# Patient Record
Sex: FEMALE | Race: WHITE | Employment: FULL TIME | URBAN - METROPOLITAN AREA
[De-identification: names, ages, dates, MRNs, and addresses within clinical notes are randomized per-mention and may not be internally consistent; named-entity substitution may affect disease eponyms.]

---

## 2017-09-11 ENCOUNTER — HOSPITAL ENCOUNTER (EMERGENCY)
Age: 32
Discharge: HOME | End: 2017-09-11
Attending: EMERGENCY MEDICINE
Payer: COMMERCIAL

## 2017-09-11 ENCOUNTER — APPOINTMENT (OUTPATIENT)
Dept: GENERAL RADIOLOGY | Age: 32
End: 2017-09-11
Attending: EMERGENCY MEDICINE
Payer: COMMERCIAL

## 2017-09-11 VITALS
WEIGHT: 285 LBS | TEMPERATURE: 97.8 F | HEART RATE: 75 BPM | HEIGHT: 67 IN | OXYGEN SATURATION: 98 % | BODY MASS INDEX: 44.73 KG/M2 | RESPIRATION RATE: 16 BRPM | DIASTOLIC BLOOD PRESSURE: 85 MMHG | SYSTOLIC BLOOD PRESSURE: 128 MMHG

## 2017-09-11 DIAGNOSIS — S93.602A SPRAIN OF LEFT FOOT, INITIAL ENCOUNTER: Primary | ICD-10-CM

## 2017-09-11 DIAGNOSIS — S93.402A SPRAIN OF LEFT ANKLE, UNSPECIFIED LIGAMENT, INITIAL ENCOUNTER: ICD-10-CM

## 2017-09-11 PROCEDURE — L4350 ANKLE CONTROL ORTHO PRE OTS: HCPCS

## 2017-09-11 PROCEDURE — 99283 EMERGENCY DEPT VISIT LOW MDM: CPT

## 2017-09-11 PROCEDURE — 73630 X-RAY EXAM OF FOOT: CPT

## 2017-09-11 PROCEDURE — 73610 X-RAY EXAM OF ANKLE: CPT

## 2017-09-11 NOTE — ED TRIAGE NOTES
Triage note: Pt arrives with c/o left foot pain and mild discoloration that began yesterday after slipping on wood floor. Pt unable to bear weight. 2+ pedal pulses bilaterally.

## 2017-09-11 NOTE — ED PROVIDER NOTES
HPI Comments: 33 y.o. female with no significant past medical history who presents ambulatory from home accompanied by her parents with chief complaint of left foot pain. Pt states she slipped and fell last night on a wood floor. Pt states foot \"got stuck behind me\" when she fell. Pt states she used ice last night expecting pain and swelling to decrease overnight, but when she woke up, she was unable to bear weight. Pt denies any audible \"pop\" during the fall. Pt denies other injury. There are no other acute medical concerns at this time. Social hx: denies tobacco use; denies EtOH use; denies illicit drug use  PCP: Chasity Ruiz MD    Note written by Jesus Foster, as dictated by Maureen Alexander MD 6:33 AM        The history is provided by the patient. No  was used. History reviewed. No pertinent past medical history. History reviewed. No pertinent surgical history. No family history on file. Social History     Social History    Marital status: SINGLE     Spouse name: N/A    Number of children: N/A    Years of education: N/A     Occupational History    Not on file. Social History Main Topics    Smoking status: Never Smoker    Smokeless tobacco: Not on file    Alcohol use No    Drug use: No    Sexual activity: Not on file     Other Topics Concern    Not on file     Social History Narrative         ALLERGIES: Review of patient's allergies indicates no known allergies. Review of Systems   Constitutional: Negative for activity change, appetite change and fatigue. HENT: Negative for ear pain, facial swelling, sore throat and trouble swallowing. Eyes: Negative for pain, discharge and visual disturbance. Respiratory: Negative for chest tightness, shortness of breath and wheezing. Cardiovascular: Negative for chest pain and palpitations. Gastrointestinal: Negative for abdominal pain, blood in stool, nausea and vomiting.    Genitourinary: Negative for difficulty urinating, flank pain and hematuria. Musculoskeletal: Negative for arthralgias, joint swelling, myalgias and neck pain. Left foot pain   Skin: Negative for color change and rash. Neurological: Negative for dizziness, weakness, numbness and headaches. Hematological: Negative for adenopathy. Does not bruise/bleed easily. Psychiatric/Behavioral: Negative for behavioral problems, confusion and sleep disturbance. All other systems reviewed and are negative. Vitals:    09/11/17 0629   BP: 128/85   Pulse: 75   Resp: 16   Temp: 97.8 °F (36.6 °C)   SpO2: 98%   Weight: 129.3 kg (285 lb)   Height: 5' 7\" (1.702 m)            Physical Exam   Constitutional: She is oriented to person, place, and time. She appears well-developed and well-nourished. No distress. HENT:   Head: Normocephalic and atraumatic. Eyes: Conjunctivae and EOM are normal. Pupils are equal, round, and reactive to light. No scleral icterus. Neck: Normal range of motion. Neck supple. No JVD present. No tracheal deviation present. No thyromegaly present. Cardiovascular: Normal rate, regular rhythm and intact distal pulses. Pulmonary/Chest: Effort normal. No respiratory distress. Musculoskeletal: Normal range of motion. She exhibits no edema. Left foot: There is tenderness and swelling. Tenderness over inferior portion of medial malleolus. No tenderness over lateral aspect of malleolus. Swelling to dorsal lateral aspect at the base of the 5th metatarsal.    Neurological: She is alert and oriented to person, place, and time. She has normal reflexes. No cranial nerve deficit. Coordination normal.   Skin: Skin is warm and dry. No rash noted. No erythema. Psychiatric: She has a normal mood and affect. Her behavior is normal. Judgment and thought content normal.   Nursing note and vitals reviewed.   Note written by Jesus Royal, as dictated by Eb Gipson MD 6:33 AM     MDM  Number of Diagnoses or Management Options  Sprain of left ankle, unspecified ligament, initial encounter: new and requires workup  Sprain of left foot, initial encounter: new and requires workup     Amount and/or Complexity of Data Reviewed  Tests in the radiology section of CPT®: ordered and reviewed  Decide to obtain previous medical records or to obtain history from someone other than the patient: yes  Review and summarize past medical records: yes  Independent visualization of images, tracings, or specimens: yes    Risk of Complications, Morbidity, and/or Mortality  Presenting problems: moderate  Diagnostic procedures: moderate  Management options: moderate    Patient Progress  Patient progress: stable    ED Course       Procedures    7:08 AM  X-rays negative. Discussed available lab and imaging results with patient. She verbalizes understanding and agrees with care plan. Will place splint. Instructed patient to rest, ice, elevate and use ibuprofen as needed. Will discharge patient home with return precautions and ortho f/u.

## 2017-09-11 NOTE — Clinical Note
Splint, ice compresses four times a day for 20 minutes at a time. Keep the ankle and foot elevated as much as possible for the next few days. Minimize weight bearing with crutches and follow up with own MD if continued difficulty in the next 4-5 days.

## 2017-09-11 NOTE — DISCHARGE INSTRUCTIONS
Ankle Sprain: Care Instructions  Your Care Instructions    An ankle sprain can happen when you twist your ankle. The ligaments that support the ankle can get stretched and torn. Often the ankle is swollen and painful. Ankle sprains may take from several weeks to several months to heal. Usually, the more pain and swelling you have, the more severe your ankle sprain is and the longer it will take to heal. You can heal faster and regain strength in your ankle with good home treatment. It is very important to give your ankle time to heal completely, so that you do not easily hurt your ankle again. Follow-up care is a key part of your treatment and safety. Be sure to make and go to all appointments, and call your doctor if you are having problems. It's also a good idea to know your test results and keep a list of the medicines you take. How can you care for yourself at home? · Prop up your foot on pillows as much as possible for the next 3 days. Try to keep your ankle above the level of your heart. This will help reduce the swelling. · Follow your doctor's directions for wearing a splint or elastic bandage. Wrapping the ankle may help reduce or prevent swelling. · Your doctor may give you a splint, a brace, an air stirrup, or another form of ankle support to protect your ankle until it is healed. Wear it as directed while your ankle is healing. Do not remove it unless your doctor tells you to. After your ankle has healed, ask your doctor whether you should wear the brace when you exercise. · Put ice or cold packs on your injured ankle for 10 to 20 minutes at a time. Try to do this every 1 to 2 hours for the next 3 days (when you are awake) or until the swelling goes down. Put a thin cloth between the ice and your skin. · You may need to use crutches until you can walk without pain. If you do use crutches, try to bear some weight on your injured ankle if you can do so without pain.  This helps the ankle heal.  · Take pain medicines exactly as directed. ¨ If the doctor gave you a prescription medicine for pain, take it as prescribed. ¨ If you are not taking a prescription pain medicine, ask your doctor if you can take an over-the-counter medicine. · If you have been given ankle exercises to do at home, do them exactly as instructed. These can promote healing and help prevent lasting weakness. When should you call for help? Call your doctor now or seek immediate medical care if:  · Your pain is getting worse. · Your swelling is getting worse. · Your splint feels too tight or you are unable to loosen it. Watch closely for changes in your health, and be sure to contact your doctor if:  · You are not getting better after 1 week. Where can you learn more? Go to http://erica-zenon.info/. Enter U353 in the search box to learn more about \"Ankle Sprain: Care Instructions. \"  Current as of: March 21, 2017  Content Version: 11.3  © 0540-0905 [employer]. Care instructions adapted under license by [employer] (which disclaims liability or warranty for this information). If you have questions about a medical condition or this instruction, always ask your healthcare professional. [place] [address] any warranty or liability for your use of this information. Foot Sprain: Care Instructions  Your Care Instructions    A foot sprain occurs when you stretch or tear the ligaments around your foot. Ligaments are the tough tissues that connect one bone to another. A sprain can happen when you run, fall, or hit your toe against something. Sprains often happen when you jump or change direction quickly. This may occur when you play basketball, soccer, or other sports. Most foot sprains will get better with treatment at home. Follow-up care is a key part of your treatment and safety.  Be sure to make and go to all appointments, and call your doctor if you are having problems. It's also a good idea to know your test results and keep a list of the medicines you take. How can you care for yourself at home? · Walk or put weight on your sprained foot as long as it does not hurt. · If your doctor gave you a splint or immobilizer, wear it as directed. If you were given crutches, use them as directed. · For the first 2 days after your injury, avoid hot showers, hot tubs, or hot packs. They may increase swelling. · Put ice or a cold pack on your foot for 10 to 20 minutes at a time to stop swelling. Try this every 1 to 2 hours for 3 days (when you are awake) or until the swelling goes down. Put a thin cloth between the ice pack and your skin. Keep your splint dry. · After 2 or 3 days, if your swelling is gone, put a heating pad (set on low) or a warm cloth on your foot. Some doctors suggest that you go back and forth between hot and cold treatments. · Prop up your foot on a pillow when you ice it or anytime you sit or lie down. Try to keep it above the level of your heart. This will help reduce swelling. · Take pain medicines exactly as directed. ¨ If the doctor gave you a prescription medicine for pain, take it as prescribed. ¨ If you are not taking a prescription pain medicine, ask your doctor if you can take an over-the-counter medicine. · Do any exercises that your doctor or physical therapist suggests. · Return to your usual exercise gradually as you feel better. When should you call for help? Call your doctor now or seek immediate medical care if:  · You have increased or severe pain. · Your toes are cool or pale or change color. · Your wrap or splint feels too tight. · You have signs of a blood clot, such as:  ¨ Pain in your calf, back of the knee, thigh, or groin. ¨ Redness and swelling in your leg or groin. · You have tingling, weakness, or numbness in your leg or foot.   Watch closely for changes in your health, and be sure to contact your doctor if:  · You cannot put any weight on your foot. · You get a fever. · You do not get better as expected. Where can you learn more? Go to http://erica-zenon.info/. Enter L958 in the search box to learn more about \"Foot Sprain: Care Instructions. \"  Current as of: March 21, 2017  Content Version: 11.3  © 7015-1461 [employer]. Care instructions adapted under license by [employer] (which disclaims liability or warranty for this information). If you have questions about a medical condition or this instruction, always ask your healthcare professional. [place] [address] any warranty or liability for your use of this information.

## 2019-09-13 ENCOUNTER — HOSPITAL ENCOUNTER (EMERGENCY)
Age: 34
Discharge: HOME | End: 2019-09-13
Attending: EMERGENCY MEDICINE
Payer: COMMERCIAL

## 2019-09-13 ENCOUNTER — APPOINTMENT (OUTPATIENT)
Dept: GENERAL RADIOLOGY | Age: 34
End: 2019-09-13
Attending: EMERGENCY MEDICINE
Payer: COMMERCIAL

## 2019-09-13 VITALS
OXYGEN SATURATION: 100 % | HEIGHT: 67 IN | RESPIRATION RATE: 20 BRPM | TEMPERATURE: 98.4 F | WEIGHT: 285 LBS | BODY MASS INDEX: 44.73 KG/M2 | DIASTOLIC BLOOD PRESSURE: 96 MMHG | SYSTOLIC BLOOD PRESSURE: 153 MMHG | HEART RATE: 95 BPM

## 2019-09-13 DIAGNOSIS — R07.9 CHEST PAIN, UNSPECIFIED TYPE: Primary | ICD-10-CM

## 2019-09-13 LAB
ALBUMIN SERPL-MCNC: 3.3 G/DL (ref 3.5–5)
ALBUMIN/GLOB SERPL: 0.7 {RATIO} (ref 1.1–2.2)
ALP SERPL-CCNC: 123 U/L (ref 45–117)
ALT SERPL-CCNC: 24 U/L (ref 12–78)
ANION GAP SERPL CALC-SCNC: 8 MMOL/L (ref 5–15)
APPEARANCE UR: ABNORMAL
AST SERPL-CCNC: <3 U/L (ref 15–37)
BACTERIA URNS QL MICRO: NEGATIVE /HPF
BILIRUB SERPL-MCNC: 0.2 MG/DL (ref 0.2–1)
BILIRUB UR QL: NEGATIVE
BUN SERPL-MCNC: 16 MG/DL (ref 6–20)
BUN/CREAT SERPL: 17 (ref 12–20)
CALCIUM SERPL-MCNC: 9.1 MG/DL (ref 8.5–10.1)
CHLORIDE SERPL-SCNC: 104 MMOL/L (ref 97–108)
CO2 SERPL-SCNC: 25 MMOL/L (ref 21–32)
COLOR UR: ABNORMAL
COMMENT, HOLDF: NORMAL
CREAT SERPL-MCNC: 0.95 MG/DL (ref 0.55–1.02)
D DIMER PPP FEU-MCNC: 0.48 MG/L FEU (ref 0–0.65)
EPITH CASTS URNS QL MICRO: ABNORMAL /LPF
ERYTHROCYTE [DISTWIDTH] IN BLOOD BY AUTOMATED COUNT: 13.7 % (ref 11.5–14.5)
GLOBULIN SER CALC-MCNC: 4.9 G/DL (ref 2–4)
GLUCOSE SERPL-MCNC: 106 MG/DL (ref 65–100)
GLUCOSE UR STRIP.AUTO-MCNC: NEGATIVE MG/DL
HCT VFR BLD AUTO: 44.4 % (ref 35–47)
HGB BLD-MCNC: 14.2 G/DL (ref 11.5–16)
HGB UR QL STRIP: NEGATIVE
KETONES UR QL STRIP.AUTO: NEGATIVE MG/DL
LEUKOCYTE ESTERASE UR QL STRIP.AUTO: NEGATIVE
MCH RBC QN AUTO: 26.2 PG (ref 26–34)
MCHC RBC AUTO-ENTMCNC: 32 G/DL (ref 30–36.5)
MCV RBC AUTO: 81.9 FL (ref 80–99)
NITRITE UR QL STRIP.AUTO: NEGATIVE
NRBC # BLD: 0 K/UL (ref 0–0.01)
NRBC BLD-RTO: 0 PER 100 WBC
PH UR STRIP: 5.5 [PH] (ref 5–8)
PLATELET # BLD AUTO: 433 K/UL (ref 150–400)
PMV BLD AUTO: 9.9 FL (ref 8.9–12.9)
POTASSIUM SERPL-SCNC: 3.8 MMOL/L (ref 3.5–5.1)
PROT SERPL-MCNC: 8.2 G/DL (ref 6.4–8.2)
PROT UR STRIP-MCNC: NEGATIVE MG/DL
RBC # BLD AUTO: 5.42 M/UL (ref 3.8–5.2)
RBC #/AREA URNS HPF: ABNORMAL /HPF (ref 0–5)
SAMPLES BEING HELD,HOLD: NORMAL
SODIUM SERPL-SCNC: 137 MMOL/L (ref 136–145)
SP GR UR REFRACTOMETRY: 1.02 (ref 1–1.03)
TROPONIN I SERPL-MCNC: <0.05 NG/ML
UR CULT HOLD, URHOLD: NORMAL
UROBILINOGEN UR QL STRIP.AUTO: 0.2 EU/DL (ref 0.2–1)
WBC # BLD AUTO: 10.6 K/UL (ref 3.6–11)
WBC URNS QL MICRO: ABNORMAL /HPF (ref 0–4)

## 2019-09-13 PROCEDURE — 74011250636 HC RX REV CODE- 250/636: Performed by: EMERGENCY MEDICINE

## 2019-09-13 PROCEDURE — 85027 COMPLETE CBC AUTOMATED: CPT

## 2019-09-13 PROCEDURE — 80053 COMPREHEN METABOLIC PANEL: CPT

## 2019-09-13 PROCEDURE — 36415 COLL VENOUS BLD VENIPUNCTURE: CPT

## 2019-09-13 PROCEDURE — 84484 ASSAY OF TROPONIN QUANT: CPT

## 2019-09-13 PROCEDURE — 81001 URINALYSIS AUTO W/SCOPE: CPT

## 2019-09-13 PROCEDURE — 71046 X-RAY EXAM CHEST 2 VIEWS: CPT

## 2019-09-13 PROCEDURE — 99284 EMERGENCY DEPT VISIT MOD MDM: CPT

## 2019-09-13 PROCEDURE — 93005 ELECTROCARDIOGRAM TRACING: CPT

## 2019-09-13 PROCEDURE — 96374 THER/PROPH/DIAG INJ IV PUSH: CPT

## 2019-09-13 PROCEDURE — 85379 FIBRIN DEGRADATION QUANT: CPT

## 2019-09-13 RX ORDER — KETOROLAC TROMETHAMINE 30 MG/ML
30 INJECTION, SOLUTION INTRAMUSCULAR; INTRAVENOUS
Status: COMPLETED | OUTPATIENT
Start: 2019-09-13 | End: 2019-09-13

## 2019-09-13 RX ADMIN — KETOROLAC TROMETHAMINE 30 MG: 30 INJECTION, SOLUTION INTRAMUSCULAR at 17:44

## 2019-09-13 NOTE — ED PROVIDER NOTES
37 yo female presents to ER for evaluation of chest pain. Onset in evening after dinner. Pain described as discomfort. Discomfort rated 2/10. Pain not exertional. No nausea or vomiting. No shortness of breath. Pt reports it does feel like she has to take deep breaths. Pain located to left chest. Pain does not radiate. No abdominal pain. Mild cough, no other uri symptoms. No fever. No myalgia. Pt has not tried any medicines prior to arrival. No aggravating factors. No alleviating factors. Pt had similar episode 1 year ago. Social hx  Nonsmoker        The history is provided by the patient. No  was used. No past medical history on file. No past surgical history on file. No family history on file.     Social History     Socioeconomic History    Marital status: SINGLE     Spouse name: Not on file    Number of children: Not on file    Years of education: Not on file    Highest education level: Not on file   Occupational History    Not on file   Social Needs    Financial resource strain: Not on file    Food insecurity:     Worry: Not on file     Inability: Not on file    Transportation needs:     Medical: Not on file     Non-medical: Not on file   Tobacco Use    Smoking status: Never Smoker   Substance and Sexual Activity    Alcohol use: No    Drug use: No    Sexual activity: Not on file   Lifestyle    Physical activity:     Days per week: Not on file     Minutes per session: Not on file    Stress: Not on file   Relationships    Social connections:     Talks on phone: Not on file     Gets together: Not on file     Attends [employer] service: Not on file     Active member of club or organization: Not on file     Attends meetings of clubs or organizations: Not on file     Relationship status: Not on file    Intimate partner violence:     Fear of current or ex partner: Not on file     Emotionally abused: Not on file     Physically abused: Not on file     Forced sexual activity: Not on file   Other Topics Concern    Not on file   Social History Narrative    Not on file         ALLERGIES: Patient has no known allergies. Review of Systems   Constitutional: Negative for chills and fever. Respiratory: Negative for cough and shortness of breath. Cardiovascular: Positive for chest pain. Negative for palpitations and leg swelling. Gastrointestinal: Negative for abdominal pain, blood in stool, diarrhea, nausea and vomiting. Genitourinary: Negative for dysuria, flank pain, frequency and urgency. Musculoskeletal: Negative for arthralgias, myalgias, neck pain and neck stiffness. Skin: Negative for rash and wound. Neurological: Negative for dizziness, numbness and headaches. All other systems reviewed and are negative. Vitals:    09/13/19 1510   BP: 147/89   Pulse: (!) 101   Resp: 18   Temp: 98.4 °F (36.9 °C)   SpO2: 100%            Physical Exam   Constitutional: She is oriented to person, place, and time. She appears well-developed and well-nourished. No distress. HENT:   Head: Normocephalic and atraumatic. Eyes: Pupils are equal, round, and reactive to light. Conjunctivae and EOM are normal.   Neck: Normal range of motion. Neck supple. Cardiovascular: Normal rate, regular rhythm, S1 normal, S2 normal, normal heart sounds and intact distal pulses. Pulses:       Carotid pulses are 2+ on the right side, and 2+ on the left side. Radial pulses are 2+ on the right side, and 2+ on the left side. Femoral pulses are 2+ on the right side, and 2+ on the left side. Dorsalis pedis pulses are 2+ on the right side, and 2+ on the left side. Posterior tibial pulses are 2+ on the right side, and 2+ on the left side. Pulmonary/Chest: Effort normal and breath sounds normal. No respiratory distress. She has no wheezes. She exhibits no tenderness. Abdominal: Soft.  Normal aorta and bowel sounds are normal. She exhibits no distension, no abdominal bruit, no pulsatile midline mass and no mass. There is no hepatosplenomegaly, splenomegaly or hepatomegaly. There is no tenderness. There is no rebound and no guarding. No hernia. Hernia confirmed negative in the ventral area. Abdomen exposed for exam. No pain with light or deep palpation. Musculoskeletal: Normal range of motion. She exhibits no edema or tenderness. Calves nontender to palpation bilaterally. No cords, negative homans sign   Lymphadenopathy:     She has no cervical adenopathy. Neurological: She is alert and oriented to person, place, and time. No cranial nerve deficit. She exhibits normal muscle tone. Coordination normal.   Skin: Skin is dry. No rash noted. No erythema. Psychiatric: She has a normal mood and affect. Her behavior is normal. Judgment and thought content normal.   Nursing note and vitals reviewed. MDM  Number of Diagnoses or Management Options  Chest pain, unspecified type:   Diagnosis management comments: 60-year-old female presenting for chest pain since yesterday. No associated symptoms. No exertional component. Abdomen is soft nontender. No calf pain. PE, ACS, musculoskeletal, pneumonia pneumothorax  Plan: X-ray, EKG, labs    5:26 PM   It is felt that the pain the patient was experiencing is consistent with non cardiac pain. There are no associated symptoms, nor is the pain exertional. The ECG is normal, as are the vital signs. The  precautionary Enzymes as well as ddimer were normal. I discussed my findings with the patient, including the uncertainties of any chest pain diagnosis. I gave strict return and follow up instructions    Patient's results have been reviewed with them. Patient and/or family have verbally conveyed their understanding and agreement of the patient's signs, symptoms, diagnosis, treatment and prognosis and additionally agree to follow up as recommended or return to the Emergency Room should their condition change prior to follow-up. Discharge instructions have also been provided to the patient with some educational information regarding their diagnosis as well a list of reasons why they would want to return to the ER prior to their follow-up appointment should their condition change. Amount and/or Complexity of Data Reviewed  Discuss the patient with other providers: yes (ER attending-[place])    Patient Progress  Patient progress: stable         Procedures    Pt case including HPI, PE, and all available lab and radiology results has been discussed with attending physician. Opportunity to evaluate patient has been provided to ER attending. Discharge and prescription plan has been agreed upon.

## 2019-09-13 NOTE — ED TRIAGE NOTES
Patient reports upper left arm pain since yesterday today she is having left chest discomfort. Denies shortness of breath nausea and vomiting. Similar symptoms once before with a panic attack.

## 2019-09-13 NOTE — DISCHARGE INSTRUCTIONS
Return to ER for any fever, chills, worsening of pain, change in pain, shortness of breath, difficulty breathing. Chest Pain: Care Instructions  Your Care Instructions    There are many things that can cause chest pain. Some are not serious and will get better on their own in a few days. But some kinds of chest pain need more testing and treatment. Your doctor may have recommended a follow-up visit in the next 8 to 12 hours. If you are not getting better, you may need more tests or treatment. Even though your doctor has released you, you still need to watch for any problems. The doctor carefully checked you, but sometimes problems can develop later. If you have new symptoms or if your symptoms do not get better, get medical care right away. If you have worse or different chest pain or pressure that lasts more than 5 minutes or you passed out (lost consciousness), call 911 or seek other emergency help right away. A medical visit is only one step in your treatment. Even if you feel better, you still need to do what your doctor recommends, such as going to all suggested follow-up appointments and taking medicines exactly as directed. This will help you recover and help prevent future problems. How can you care for yourself at home? · Rest until you feel better. · Take your medicine exactly as prescribed. Call your doctor if you think you are having a problem with your medicine. · Do not drive after taking a prescription pain medicine. When should you call for help? Call 911 if:    · You passed out (lost consciousness).     · You have severe difficulty breathing.     · You have symptoms of a heart attack. These may include:  ? Chest pain or pressure, or a strange feeling in your chest.  ? Sweating. ? Shortness of breath. ? Nausea or vomiting. ? Pain, pressure, or a strange feeling in your back, neck, jaw, or upper belly or in one or both shoulders or arms. ? Lightheadedness or sudden weakness. ?  A fast or irregular heartbeat. After you call 911, the  may tell you to chew 1 adult-strength or 2 to 4 low-dose aspirin. Wait for an ambulance. Do not try to drive yourself.    Call your doctor today if:    · You have any trouble breathing.     · Your chest pain gets worse.     · You are dizzy or lightheaded, or you feel like you may faint.     · You are not getting better as expected.     · You are having new or different chest pain. Where can you learn more? Go to http://erica-zenon.info/. Enter A120 in the search box to learn more about \"Chest Pain: Care Instructions. \"  Current as of: September 23, 2018  Content Version: 12.1  © 0613-6079 [employer]. Care instructions adapted under license by [employer] (which disclaims liability or warranty for this information). If you have questions about a medical condition or this instruction, always ask your healthcare professional. [place] [address] any warranty or liability for your use of this information.

## 2019-09-15 LAB
ATRIAL RATE: 96 BPM
CALCULATED P AXIS, ECG09: 32 DEGREES
CALCULATED R AXIS, ECG10: 1 DEGREES
CALCULATED T AXIS, ECG11: 2 DEGREES
DIAGNOSIS, 93000: NORMAL
P-R INTERVAL, ECG05: 156 MS
Q-T INTERVAL, ECG07: 346 MS
QRS DURATION, ECG06: 86 MS
QTC CALCULATION (BEZET), ECG08: 437 MS
VENTRICULAR RATE, ECG03: 96 BPM

## 2020-02-04 ENCOUNTER — OFFICE VISIT (OUTPATIENT)
Dept: FAMILY MEDICINE CLINIC | Age: 35
End: 2020-02-04

## 2020-02-04 VITALS
RESPIRATION RATE: 16 BRPM | DIASTOLIC BLOOD PRESSURE: 94 MMHG | OXYGEN SATURATION: 98 % | TEMPERATURE: 98.7 F | WEIGHT: 293 LBS | BODY MASS INDEX: 45.99 KG/M2 | HEART RATE: 88 BPM | HEIGHT: 67 IN | SYSTOLIC BLOOD PRESSURE: 141 MMHG

## 2020-02-04 DIAGNOSIS — Z76.89 ESTABLISHING CARE WITH NEW DOCTOR, ENCOUNTER FOR: Primary | ICD-10-CM

## 2020-02-04 DIAGNOSIS — Z87.898 HISTORY OF SNORING: ICD-10-CM

## 2020-02-04 DIAGNOSIS — E66.01 OBESITY, MORBID (HCC): ICD-10-CM

## 2020-02-04 DIAGNOSIS — R03.0 ELEVATED BLOOD PRESSURE READING: ICD-10-CM

## 2020-02-04 RX ORDER — MOMETASONE FUROATE 50 UG/1
2 SPRAY, METERED NASAL DAILY
COMMUNITY

## 2020-02-04 RX ORDER — SPIRONOLACTONE 100 MG/1
TABLET, FILM COATED ORAL
COMMUNITY
Start: 2020-01-22 | End: 2021-06-14 | Stop reason: ALTCHOICE

## 2020-02-04 RX ORDER — BISMUTH SUBSALICYLATE 262 MG
1 TABLET,CHEWABLE ORAL DAILY
COMMUNITY

## 2020-02-04 RX ORDER — DROSPIRENONE AND ETHINYL ESTRADIOL 0.03MG-3MG
KIT ORAL
COMMUNITY
Start: 2019-11-12 | End: 2021-06-14 | Stop reason: ALTCHOICE

## 2020-02-04 NOTE — PROGRESS NOTES
Chief Complaint   Patient presents with    Establish Care    Anxiety       1. Have you been to the ER, urgent care clinic since your last visit? Hospitalized since your last visit? No    2. Have you seen or consulted any other health care providers outside of the [address] [place] [address] since your last visit? Include any pap smears or colon screening.  No

## 2020-02-04 NOTE — PROGRESS NOTES
Assessment/ Plan:   Full age appropriate History and Physical exam as well as health care maintenance  performed and discussed today. Risk factor modification discussed today includes safe sex practices, healthy diet and exercise, and seat belt use. Continue current medications. Diagnoses and all orders for this visit:    1. Establishing care with new doctor, encounter for  Recent routine labs were reviewed and at goal.    2. History of snoring  -     REFERRAL TO PULMONARY DISEASE to rule out GODWIN. 3. Elevated blood pressure reading  Return in 6 months and recheck. Lifestyle modifications encouraged today. 4. Obesity, morbid ([address]  I have reviewed/discussed the above normal BMI with the patient. I have recommended the following interventions: dietary management education, guidance, and counseling, encourage exercise, monitor weight and prescribed dietary intake. Given written instructions as well. Follow-up and Dispositions    · Return in about 6 months (around 8/4/2020) for Follow Up. Discussed expected course/resolution/complications of diagnosis in detail with patient.    Medication risks/benefits/costs/interactions/alternatives discussed with patient.    Pt was given after visit summary which includes diagnoses, current medications & vitals. Pt expressed understanding with the diagnosis and plan      HPI:   Dahlia Manzanares is a 36 y.o. female who presents for annual exam and to establish care. She is followed by Inge Epps, ob/gyn for routine care, birth control, and spironolactone for acne. She is recently started exercising with her boyfriend in the form of walking. She does not follow a specific diet plan. 334 pounds at her current weight is her heaviest lifetime weight. 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 Reports seasonal depression around [place] which was significantly worse this season. No previous treatment.   She is not interested in medications at this time. Denies SI/HI. Symptoms last 2 weeks and resolve spontaneously. Current Outpatient Medications   Medication Sig Dispense Refill    spironolactone (ALDACTONE) 100 mg tablet       drospirenone-ethinyl estradioL (KAR) 3-0.03 mg tab       multivitamin (ONE A DAY) tablet Take 1 Tab by mouth daily.  B.infantis-B.ani-B.long-B.bifi (PROBIOTIC 4X) 10-15 mg TbEC Take  by mouth.  mometasone (NASONEX) 50 mcg/actuation nasal spray 2 Sprays daily. No Known Allergies   Patient Active Problem List   Diagnosis Code    Obesity, morbid ([facility] E66.01     Past Medical History:   Diagnosis Date    Anxiety     Depression       History reviewed. No pertinent surgical history. No LMP recorded (lmp unknown).    Family History   Problem Relation Age of Onset    Asthma Mother     Anxiety Mother     Stroke Paternal Grandmother       Social History     Socioeconomic History    Marital status: UNKNOWN     Spouse name: Not on file    Number of children: Not on file    Years of education: Not on file    Highest education level: Not on file   Occupational History    Not on file   Social Needs    Financial resource strain: Not on file    Food insecurity:     Worry: Not on file     Inability: Not on file    Transportation needs:     Medical: Not on file     Non-medical: Not on file   Tobacco Use    Smoking status: Never Smoker    Smokeless tobacco: Never Used   Substance and Sexual Activity    Alcohol use: Never     Frequency: Never    Drug use: Never    Sexual activity: Yes     Partners: Male     Birth control/protection: Pill   Lifestyle    Physical activity:     Days per week: Not on file     Minutes per session: Not on file    Stress: Not on file   Relationships    Social connections:     Talks on phone: Not on file     Gets together: Not on file     Attends Gnosticism service: Not on file     Active member of club or organization: Not on file     Attends meetings of clubs or organizations: Not on file     Relationship status: Not on file    Intimate partner violence:     Fear of current or ex partner: Not on file     Emotionally abused: Not on file     Physically abused: Not on file     Forced sexual activity: Not on file   Other Topics Concern    Not on file   Social History Narrative    Not on file        ROS:     Review of Systems   Constitutional: Negative for fever, malaise/fatigue and weight loss. HENT: Negative for hearing loss. Eyes: Negative for blurred vision and pain. Respiratory: Negative for cough and shortness of breath. Cardiovascular: Negative for chest pain, palpitations and leg swelling. Gastrointestinal: Negative for abdominal pain, blood in stool, constipation, diarrhea and melena. Genitourinary: Negative for dysuria and hematuria. Musculoskeletal: Negative for joint pain. Skin: Negative for rash. Neurological: Negative for headaches. Psychiatric/Behavioral: Negative for depression. The patient is not nervous/anxious and does not have insomnia. Physical Exam:     Visit Vitals  BP (!) 141/94   Pulse 88   Temp 98.7 °F (37.1 °C) (Oral)   Resp 16   Ht 5' 7\" (1.702 m)   Wt 334 lb 9.6 oz (151.8 kg)   LMP  (LMP Unknown)   SpO2 98%   BMI 52.41 kg/m²        Nursing Documentation and Vital Signs Reviewed. Physical Exam  Constitutional:       General: She is not in acute distress. Appearance: She is obese. HENT:      Right Ear: No drainage. No middle ear effusion. Tympanic membrane is not injected, erythematous or bulging. Left Ear: No drainage. No middle ear effusion. Tympanic membrane is not injected, erythematous or bulging. Nose: No mucosal edema or rhinorrhea. Mouth/Throat:      Dentition: Normal dentition. Pharynx: No oropharyngeal exudate or posterior oropharyngeal erythema. Tonsils: No tonsillar abscesses. Eyes:      Pupils: Pupils are equal, round, and reactive to light.    Neck: Thyroid: No thyroid mass or thyromegaly. Vascular: No carotid bruit or JVD. Trachea: No tracheal deviation. Cardiovascular:      Pulses:           Dorsalis pedis pulses are 2+ on the right side and 2+ on the left side. Posterior tibial pulses are 2+ on the right side and 2+ on the left side. Heart sounds: S1 normal and S2 normal. No murmur. No friction rub. No gallop. Pulmonary:      Breath sounds: Normal breath sounds. No wheezing. Abdominal:      General: Bowel sounds are normal. There is no distension. Palpations: Abdomen is soft. There is no mass. Tenderness: There is no abdominal tenderness. Lymphadenopathy:      Cervical: No cervical adenopathy. Skin:     General: Skin is warm and dry. Neurological:      Cranial Nerves: No cranial nerve deficit. Sensory: Sensation is intact. Motor: Motor function is intact. Gait: Gait is intact.    Psychiatric:         Mood and Affect: Mood and affect normal.         Cognition and Memory: Memory normal.         Judgment: Judgment normal.

## 2020-03-17 ENCOUNTER — TELEPHONE (OUTPATIENT)
Dept: FAMILY MEDICINE CLINIC | Age: 35
End: 2020-03-17

## 2020-03-17 NOTE — TELEPHONE ENCOUNTER
Called patient, name/ verified. Reports cough since last week, now productive and deep. Having fatigue and some mild diarrhea. Denies fevers, abdominal pain, known contacts with COVID or flu. 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 Taking Delsym which is helping. Advised pt to continue with Delsym, prn Pepto Bismol/Imodium for diarrhea, watch for worsening symptoms, and practice social distancing for at least for 14 days from her last work day (Thursday). Pt expressed understanding of plan.

## 2020-03-17 NOTE — TELEPHONE ENCOUNTER
----- Message from Magdalene Luna sent at 3/17/2020  7:38 AM EDT -----  Regarding: LM Hauser/telephone  Pt would like to speak to the doctor regarding her cough, chills and diarrhea, pt trying to see what can be given for this without her coming in the office, please call pt at [phone]

## 2020-06-08 ENCOUNTER — OFFICE VISIT (OUTPATIENT)
Dept: PRIMARY CARE CLINIC | Age: 35
End: 2020-06-08

## 2020-06-08 VITALS — HEART RATE: 95 BPM | RESPIRATION RATE: 16 BRPM | TEMPERATURE: 98.8 F | OXYGEN SATURATION: 96 %

## 2020-06-08 DIAGNOSIS — Z11.59 SPECIAL SCREENING EXAMINATION FOR UNSPECIFIED VIRAL DISEASE: ICD-10-CM

## 2020-06-08 DIAGNOSIS — R05.9 COUGH: ICD-10-CM

## 2020-06-08 DIAGNOSIS — R09.82 POST-NASAL DRIP: Primary | ICD-10-CM

## 2020-06-08 RX ORDER — BENZONATATE 200 MG/1
200 CAPSULE ORAL
Qty: 21 CAP | Refills: 0 | Status: SHIPPED | OUTPATIENT
Start: 2020-06-08 | End: 2020-06-19 | Stop reason: SDUPTHER

## 2020-06-08 NOTE — PROGRESS NOTES
Patient is being seen at the [facility]. Please see scanned documentation as well for further information. Patient consented for treatment. S:  Ms. Eliza Messina presents for Covid testing. Patient reports current Covid type symptoms. Cough in Nov that lasted months after illness- Seemed to resolve late winter. Around mid April started with cough again. Taking flonase and mucinex with mild relief. States occ productive cough. mucinex seems to encourage ability to produce mucous. Denies other symptoms, fever, body aches, ill feeling. States staying at home - little public interaction but boyfriend does work at pharmacy. O:    Visit Vitals  Pulse 95   Temp 98.8 °F (37.1 °C) (Oral)   Resp 16   SpO2 96%     Alert and oriented  No acute distress, no increased work of breathing  Normocephalic, atraumatic  Skin color normal  Calm and cooperative  Voice clear, conversant without shortness of breath  Conjunctiva normal  Lungs cta bilat  Heart RRR. Pharynx no exudate no redness    A/P:  Concern for Covid 19  Cough - suspect allergic rhinitis. Start Zyrtec 10mg daily- discussed flonase use, can continue Mucinex prn. Will do covid swab for if not improving can be seen in clinic although my suspicion is low for active covid 19. Covid 19 testing performed  Patient understands she will be contacted with the results. Supportive care, follow up prn with primary care provider discussed.

## 2020-06-10 LAB — SARS-COV-2, NAA: NOT DETECTED

## 2020-06-22 RX ORDER — BENZONATATE 200 MG/1
200 CAPSULE ORAL
Qty: 21 CAP | Refills: 0 | Status: CANCELLED | OUTPATIENT
Start: 2020-06-22 | End: 2020-06-29

## 2020-06-22 RX ORDER — BENZONATATE 200 MG/1
200 CAPSULE ORAL
Qty: 21 CAP | Refills: 0 | Status: SHIPPED | OUTPATIENT
Start: 2020-06-22 | End: 2020-06-29

## 2020-06-22 NOTE — TELEPHONE ENCOUNTER
Last visit 06/08/2020 Flu clinic  [address]   Next appointment 08/06/2020 NP Murtaza   Previous refill encounter(s) 06/08/2020 Tessalon Pearls #[id number]    Requested Prescriptions     Pending Prescriptions Disp Refills    benzonatate (TESSALON) 200 mg capsule 21 Cap 0     Sig: Take 1 Cap by mouth three (3) times daily as needed for Cough for up to 7 days.

## 2020-10-05 ENCOUNTER — HOSPITAL ENCOUNTER (EMERGENCY)
Age: 35
Discharge: HOME | End: 2020-10-05
Attending: EMERGENCY MEDICINE
Payer: COMMERCIAL

## 2020-10-05 ENCOUNTER — APPOINTMENT (OUTPATIENT)
Dept: CT IMAGING | Age: 35
End: 2020-10-05
Attending: EMERGENCY MEDICINE
Payer: COMMERCIAL

## 2020-10-05 VITALS
WEIGHT: 293 LBS | HEART RATE: 91 BPM | OXYGEN SATURATION: 97 % | DIASTOLIC BLOOD PRESSURE: 89 MMHG | SYSTOLIC BLOOD PRESSURE: 138 MMHG | HEIGHT: 67 IN | BODY MASS INDEX: 45.99 KG/M2 | RESPIRATION RATE: 18 BRPM | TEMPERATURE: 98.1 F

## 2020-10-05 DIAGNOSIS — R10.12 ABDOMINAL PAIN, LUQ (LEFT UPPER QUADRANT): Primary | ICD-10-CM

## 2020-10-05 DIAGNOSIS — N20.0 KIDNEY STONE: ICD-10-CM

## 2020-10-05 LAB
ALBUMIN SERPL-MCNC: 3.5 G/DL (ref 3.5–5)
ALBUMIN/GLOB SERPL: 0.6 {RATIO} (ref 1.1–2.2)
ALP SERPL-CCNC: 140 U/L (ref 45–117)
ALT SERPL-CCNC: 32 U/L (ref 12–78)
ANION GAP SERPL CALC-SCNC: 7 MMOL/L (ref 5–15)
APPEARANCE UR: ABNORMAL
AST SERPL-CCNC: 10 U/L (ref 15–37)
BACTERIA URNS QL MICRO: ABNORMAL /HPF
BASOPHILS # BLD: 0 K/UL (ref 0–0.1)
BASOPHILS NFR BLD: 0 % (ref 0–1)
BILIRUB SERPL-MCNC: 0.2 MG/DL (ref 0.2–1)
BILIRUB UR QL: NEGATIVE
BUN SERPL-MCNC: 13 MG/DL (ref 6–20)
BUN/CREAT SERPL: 15 (ref 12–20)
CALCIUM SERPL-MCNC: 10 MG/DL (ref 8.5–10.1)
CHLORIDE SERPL-SCNC: 104 MMOL/L (ref 97–108)
CO2 SERPL-SCNC: 25 MMOL/L (ref 21–32)
COLOR UR: ABNORMAL
COMMENT, HOLDF: NORMAL
CREAT SERPL-MCNC: 0.89 MG/DL (ref 0.55–1.02)
DIFFERENTIAL METHOD BLD: ABNORMAL
EOSINOPHIL # BLD: 0.1 K/UL (ref 0–0.4)
EOSINOPHIL NFR BLD: 1 % (ref 0–7)
EPITH CASTS URNS QL MICRO: ABNORMAL /LPF
ERYTHROCYTE [DISTWIDTH] IN BLOOD BY AUTOMATED COUNT: 14.5 % (ref 11.5–14.5)
GLOBULIN SER CALC-MCNC: 5.4 G/DL (ref 2–4)
GLUCOSE SERPL-MCNC: 152 MG/DL (ref 65–100)
GLUCOSE UR STRIP.AUTO-MCNC: NEGATIVE MG/DL
HCG UR QL: NEGATIVE
HCT VFR BLD AUTO: 46.6 % (ref 35–47)
HGB BLD-MCNC: 14.8 G/DL (ref 11.5–16)
HGB UR QL STRIP: NEGATIVE
IMM GRANULOCYTES # BLD AUTO: 0.1 K/UL (ref 0–0.04)
IMM GRANULOCYTES NFR BLD AUTO: 1 % (ref 0–0.5)
KETONES UR QL STRIP.AUTO: NEGATIVE MG/DL
LEUKOCYTE ESTERASE UR QL STRIP.AUTO: NEGATIVE
LIPASE SERPL-CCNC: 97 U/L (ref 73–393)
LYMPHOCYTES # BLD: 1.3 K/UL (ref 0.8–3.5)
LYMPHOCYTES NFR BLD: 13 % (ref 12–49)
MCH RBC QN AUTO: 25.3 PG (ref 26–34)
MCHC RBC AUTO-ENTMCNC: 31.8 G/DL (ref 30–36.5)
MCV RBC AUTO: 79.7 FL (ref 80–99)
MONOCYTES # BLD: 0.4 K/UL (ref 0–1)
MONOCYTES NFR BLD: 4 % (ref 5–13)
NEUTS SEG # BLD: 8.2 K/UL (ref 1.8–8)
NEUTS SEG NFR BLD: 81 % (ref 32–75)
NITRITE UR QL STRIP.AUTO: NEGATIVE
NRBC # BLD: 0 K/UL (ref 0–0.01)
NRBC BLD-RTO: 0 PER 100 WBC
PH UR STRIP: 5 [PH] (ref 5–8)
PLATELET # BLD AUTO: 479 K/UL (ref 150–400)
PMV BLD AUTO: 9.7 FL (ref 8.9–12.9)
POTASSIUM SERPL-SCNC: 4.2 MMOL/L (ref 3.5–5.1)
PROT SERPL-MCNC: 8.9 G/DL (ref 6.4–8.2)
PROT UR STRIP-MCNC: 30 MG/DL
RBC # BLD AUTO: 5.85 M/UL (ref 3.8–5.2)
RBC #/AREA URNS HPF: ABNORMAL /HPF (ref 0–5)
SAMPLES BEING HELD,HOLD: NORMAL
SODIUM SERPL-SCNC: 136 MMOL/L (ref 136–145)
SP GR UR REFRACTOMETRY: 1.03 (ref 1–1.03)
UR CULT HOLD, URHOLD: NORMAL
UROBILINOGEN UR QL STRIP.AUTO: 0.2 EU/DL (ref 0.2–1)
WBC # BLD AUTO: 10.1 K/UL (ref 3.6–11)
WBC URNS QL MICRO: ABNORMAL /HPF (ref 0–4)

## 2020-10-05 PROCEDURE — 74011000636 HC RX REV CODE- 636: Performed by: RADIOLOGY

## 2020-10-05 PROCEDURE — 74011000258 HC RX REV CODE- 258: Performed by: RADIOLOGY

## 2020-10-05 PROCEDURE — 74011250636 HC RX REV CODE- 250/636: Performed by: EMERGENCY MEDICINE

## 2020-10-05 PROCEDURE — 96374 THER/PROPH/DIAG INJ IV PUSH: CPT

## 2020-10-05 PROCEDURE — 96376 TX/PRO/DX INJ SAME DRUG ADON: CPT

## 2020-10-05 PROCEDURE — 74177 CT ABD & PELVIS W/CONTRAST: CPT

## 2020-10-05 PROCEDURE — 85025 COMPLETE CBC W/AUTO DIFF WBC: CPT

## 2020-10-05 PROCEDURE — 96375 TX/PRO/DX INJ NEW DRUG ADDON: CPT

## 2020-10-05 PROCEDURE — 99282 EMERGENCY DEPT VISIT SF MDM: CPT

## 2020-10-05 PROCEDURE — 80053 COMPREHEN METABOLIC PANEL: CPT

## 2020-10-05 PROCEDURE — 83690 ASSAY OF LIPASE: CPT

## 2020-10-05 PROCEDURE — 81001 URINALYSIS AUTO W/SCOPE: CPT

## 2020-10-05 PROCEDURE — 81025 URINE PREGNANCY TEST: CPT

## 2020-10-05 PROCEDURE — 36415 COLL VENOUS BLD VENIPUNCTURE: CPT

## 2020-10-05 RX ORDER — HYDROCODONE BITARTRATE AND ACETAMINOPHEN 5; 325 MG/1; MG/1
1 TABLET ORAL
Qty: 12 TAB | Refills: 0 | Status: SHIPPED | OUTPATIENT
Start: 2020-10-05 | End: 2020-10-08

## 2020-10-05 RX ORDER — SODIUM CHLORIDE 0.9 % (FLUSH) 0.9 %
10 SYRINGE (ML) INJECTION
Status: COMPLETED | OUTPATIENT
Start: 2020-10-05 | End: 2020-10-05

## 2020-10-05 RX ORDER — ONDANSETRON 8 MG/1
8 TABLET, ORALLY DISINTEGRATING ORAL
Qty: 8 TAB | Refills: 0 | Status: SHIPPED | OUTPATIENT
Start: 2020-10-05 | End: 2021-06-14 | Stop reason: ALTCHOICE

## 2020-10-05 RX ORDER — KETOROLAC TROMETHAMINE 30 MG/ML
15 INJECTION, SOLUTION INTRAMUSCULAR; INTRAVENOUS
Status: COMPLETED | OUTPATIENT
Start: 2020-10-05 | End: 2020-10-05

## 2020-10-05 RX ORDER — ONDANSETRON 2 MG/ML
4 INJECTION INTRAMUSCULAR; INTRAVENOUS ONCE
Status: COMPLETED | OUTPATIENT
Start: 2020-10-05 | End: 2020-10-05

## 2020-10-05 RX ORDER — KETOROLAC TROMETHAMINE 10 MG/1
10 TABLET, FILM COATED ORAL
Qty: 16 TAB | Refills: 0 | Status: SHIPPED | OUTPATIENT
Start: 2020-10-05 | End: 2021-06-14 | Stop reason: ALTCHOICE

## 2020-10-05 RX ORDER — TAMSULOSIN HYDROCHLORIDE 0.4 MG/1
0.4 CAPSULE ORAL DAILY
Qty: 15 CAP | Refills: 0 | Status: SHIPPED | OUTPATIENT
Start: 2020-10-05 | End: 2020-10-20

## 2020-10-05 RX ADMIN — KETOROLAC TROMETHAMINE 15 MG: 30 INJECTION, SOLUTION INTRAMUSCULAR at 06:01

## 2020-10-05 RX ADMIN — SODIUM CHLORIDE 50 ML: 900 INJECTION, SOLUTION INTRAVENOUS at 07:19

## 2020-10-05 RX ADMIN — IOPAMIDOL 100 ML: 755 INJECTION, SOLUTION INTRAVENOUS at 07:19

## 2020-10-05 RX ADMIN — ONDANSETRON 4 MG: 2 INJECTION INTRAMUSCULAR; INTRAVENOUS at 06:01

## 2020-10-05 RX ADMIN — KETOROLAC TROMETHAMINE 15 MG: 30 INJECTION, SOLUTION INTRAMUSCULAR at 08:54

## 2020-10-05 RX ADMIN — Medication 10 ML: at 07:19

## 2020-10-05 RX ADMIN — SODIUM CHLORIDE 1000 ML: 9 INJECTION, SOLUTION INTRAVENOUS at 06:01

## 2020-10-05 NOTE — ED PROVIDER NOTES
72-year-old female presents from home via private vehicle accompanied by her boyfriend with a chief complaint of left upper quadrant abdominal pain. Symptoms started around 10 hours ago while the patient was watching TV. She reports sudden onset of sharp left-sided pain with radiation around to the left flank. Pain worsened with movement. No other radiation. Patient took Pepto-Bismol and Gas-X with no relief of her pain. Symptoms worsened over the past couple of hours which prompted her visit to the emergency department. She also started vomiting in the past few hours. She is not had a bowel movement in the past day or so. She denies any history of abdominal surgery. She has had similar pain in the past and was diagnosed with impacted stool. She denies any fever at home. No cough or shortness of breath. No urinary symptoms. Past Medical History:   Diagnosis Date    Acne     Anxiety     Depression        History reviewed. No pertinent surgical history.       Family History:   Problem Relation Age of Onset    Asthma Mother     Anxiety Mother     Stroke Paternal Grandmother        Social History     Socioeconomic History    Marital status: UNKNOWN     Spouse name: Not on file    Number of children: Not on file    Years of education: Not on file    Highest education level: Not on file   Occupational History    Not on file   Social Needs    Financial resource strain: Not on file    Food insecurity     Worry: Not on file     Inability: Not on file   [employer] needs     Medical: Not on file     Non-medical: Not on file   Tobacco Use    Smoking status: Never Smoker    Smokeless tobacco: Never Used   Substance and Sexual Activity    Alcohol use: Never     Frequency: Never    Drug use: Never    Sexual activity: Yes     Partners: Male     Birth control/protection: Pill   Lifestyle    Physical activity     Days per week: Not on file     Minutes per session: Not on file    Stress: Not on file   Relationships    Social connections     Talks on phone: Not on file     Gets together: Not on file     Attends Mandaen service: Not on file     Active member of club or organization: Not on file     Attends meetings of clubs or organizations: Not on file     Relationship status: Not on file    Intimate partner violence     Fear of current or ex partner: Not on file     Emotionally abused: Not on file     Physically abused: Not on file     Forced sexual activity: Not on file   Other Topics Concern    Not on file   Social History Narrative    ** Merged History Encounter **              ALLERGIES: Patient has no known allergies. Review of Systems   Constitutional: Negative for fever. HENT: Negative for facial swelling. Eyes: Negative for visual disturbance. Respiratory: Negative for chest tightness. Cardiovascular: Negative for chest pain. Gastrointestinal: Negative for abdominal pain. Genitourinary: Negative for difficulty urinating and dysuria. Musculoskeletal: Negative for arthralgias. Skin: Negative for rash. Neurological: Negative for headaches. Hematological: Negative for adenopathy. Psychiatric/Behavioral: Negative for suicidal ideas. Vitals:    10/05/20 0546   BP: 138/89   Pulse: 91   Resp: 18   Temp: 98.1 °F (36.7 °C)   SpO2: 97%   Weight: (!) 161.1 kg (355 lb 2.6 oz)   Height: 5' 7\" (1.702 m)            Physical Exam  Vitals signs and nursing note reviewed. Constitutional:       General: She is not in acute distress. Appearance: She is well-developed. She is obese. HENT:      Head: Normocephalic and atraumatic. Eyes:      General: No scleral icterus. Conjunctiva/sclera: Conjunctivae normal.      Pupils: Pupils are equal, round, and reactive to light. Neck:      Musculoskeletal: Normal range of motion and neck supple. Cardiovascular:      Rate and Rhythm: Normal rate. Heart sounds: No murmur.    Pulmonary:      Effort: Pulmonary effort is normal. No respiratory distress. Abdominal:      General: There is no distension. Palpations: Abdomen is soft. Musculoskeletal: Normal range of motion. Skin:     General: Skin is warm and dry. Findings: No rash. Neurological:      Mental Status: She is alert and oriented to person, place, and time. MDM  Number of Diagnoses or Management Options  Diagnosis management comments: Assessment: Patient with left-sided abdominal pain and vomiting. Concerning possibilities include diverticulitis, splenic infarct, pyelonephritis, kidney infarction, bowel obstruction, impacted stool. Plan to obtain blood work, urine, CT scan of the abdomen pelvis. We will treat her symptoms with IV fluids, Toradol, Zofran.           Procedures

## 2020-10-05 NOTE — DISCHARGE INSTRUCTIONS
Patient Education        Kidney Stone: Care Instructions  Your Care Instructions     Kidney stones are formed when salts, minerals, and other substances normally found in the urine clump together. They can be as small as grains of sand or, rarely, as large as golf balls. While the stone is traveling through the ureter, which is the tube that carries urine from the kidney to the bladder, you will probably feel pain. The pain may be mild or very severe. You may also have some blood in your urine. As soon as the stone reaches the bladder, any intense pain should go away. If a stone is too large to pass on its own, you may need a medical procedure to help you pass the stone. The doctor has checked you carefully, but problems can develop later. If you notice any problems or new symptoms, get medical treatment right away. Follow-up care is a key part of your treatment and safety. Be sure to make and go to all appointments, and call your doctor if you are having problems. It's also a good idea to know your test results and keep a list of the medicines you take. How can you care for yourself at home? · Drink plenty of fluids, enough so that your urine is light yellow or clear like water. If you have kidney, heart, or liver disease and have to limit fluids, talk with your doctor before you increase the amount of fluids you drink. · Take pain medicines exactly as directed. Call your doctor if you think you are having a problem with your medicine. ? If the doctor gave you a prescription medicine for pain, take it as prescribed. ? If you are not taking a prescription pain medicine, ask your doctor if you can take an over-the-counter medicine. Read and follow all instructions on the label. · Your doctor may ask you to strain your urine so that you can collect your kidney stone when it passes. You can use a kitchen strainer or a tea strainer to catch the stone.  Store it in a plastic bag until you see your doctor again.  Preventing future kidney stones  Some changes in your diet may help prevent kidney stones. Depending on the cause of your stones, your doctor may recommend that you:  · Drink plenty of fluids, enough so that your urine is light yellow or clear like water. If you have kidney, heart, or liver disease and have to limit fluids, talk with your doctor before you increase the amount of fluids you drink. · Limit coffee, tea, and alcohol. Also avoid grapefruit juice. · Do not take more than the recommended daily dose of vitamins C and D.  · Avoid antacids such as Gaviscon, Maalox, Mylanta, or Tums. · Limit the amount of salt (sodium) in your diet. · Eat a balanced diet that is not too high in protein. · Limit foods that are high in a substance called oxalate, which can cause kidney stones. These foods include dark green vegetables, rhubarb, chocolate, wheat bran, nuts, cranberries, and beans. When should you call for help? Call your doctor now or seek immediate medical care if:    · You cannot keep down fluids.     · Your pain gets worse.     · You have a fever or chills.     · You have new or worse pain in your back just below your rib cage (the flank area).     · You have new or more blood in your urine. Watch closely for changes in your health, and be sure to contact your doctor if:    · You do not get better as expected. Where can you learn more? Go to http://www.gray.com/  Enter G681 in the search box to learn more about \"Kidney Stone: Care Instructions. \"  Current as of: April 15, 2020               Content Version: 12.6  © 3466-9698 [employer]. Care instructions adapted under license by [employer] (which disclaims liability or warranty for this information).  If you have questions about a medical condition or this instruction, always ask your healthcare professional. [address] any warranty or liability for your use of this information. You will need to strain all urine and if you pass a stone and save it for your evaluation by the urologist in the next week. Medications as directed for fluid, nausea and pain. Call urologist sooner if any worsening of symptoms.

## 2020-10-05 NOTE — ED TRIAGE NOTES
Arrives with cc of LUQ pain that radiates throughout her abd, chest and back and began Sunday 0300. Has had 2 episodes of vomiting. Last BM today but it was not \"a proper bowel movement. \" +n/v. Took a pepto bismol tablet and a antigas tablet with no relief.

## 2021-02-07 ENCOUNTER — APPOINTMENT (OUTPATIENT)
Dept: CT IMAGING | Age: 36
End: 2021-02-07
Attending: EMERGENCY MEDICINE
Payer: COMMERCIAL

## 2021-02-07 ENCOUNTER — HOSPITAL ENCOUNTER (EMERGENCY)
Age: 36
Discharge: HOME | End: 2021-02-07
Attending: EMERGENCY MEDICINE
Payer: COMMERCIAL

## 2021-02-07 VITALS
SYSTOLIC BLOOD PRESSURE: 187 MMHG | RESPIRATION RATE: 16 BRPM | DIASTOLIC BLOOD PRESSURE: 114 MMHG | OXYGEN SATURATION: 97 % | HEART RATE: 89 BPM | TEMPERATURE: 97.2 F

## 2021-02-07 DIAGNOSIS — N39.0 URINARY TRACT INFECTION WITHOUT HEMATURIA, SITE UNSPECIFIED: ICD-10-CM

## 2021-02-07 DIAGNOSIS — R10.9 FLANK PAIN: Primary | ICD-10-CM

## 2021-02-07 LAB
ALBUMIN SERPL-MCNC: 3.6 G/DL (ref 3.5–5)
ALBUMIN/GLOB SERPL: 0.7 {RATIO} (ref 1.1–2.2)
ALP SERPL-CCNC: 131 U/L (ref 45–117)
ALT SERPL-CCNC: 31 U/L (ref 12–78)
AMORPH CRY URNS QL MICRO: ABNORMAL
ANION GAP SERPL CALC-SCNC: 7 MMOL/L (ref 5–15)
APPEARANCE UR: ABNORMAL
AST SERPL-CCNC: 7 U/L (ref 15–37)
ATRIAL RATE: 77 BPM
BACTERIA URNS QL MICRO: ABNORMAL /HPF
BASOPHILS # BLD: 0 K/UL (ref 0–0.1)
BASOPHILS NFR BLD: 0 % (ref 0–1)
BILIRUB SERPL-MCNC: 0.2 MG/DL (ref 0.2–1)
BILIRUB UR QL: NEGATIVE
BUN SERPL-MCNC: 17 MG/DL (ref 6–20)
BUN/CREAT SERPL: 19 (ref 12–20)
CALCIUM SERPL-MCNC: 9.9 MG/DL (ref 8.5–10.1)
CALCULATED P AXIS, ECG09: 22 DEGREES
CALCULATED R AXIS, ECG10: 25 DEGREES
CALCULATED T AXIS, ECG11: 15 DEGREES
CHLORIDE SERPL-SCNC: 108 MMOL/L (ref 97–108)
CO2 SERPL-SCNC: 23 MMOL/L (ref 21–32)
COLOR UR: ABNORMAL
COMMENT, HOLDF: NORMAL
CREAT SERPL-MCNC: 0.88 MG/DL (ref 0.55–1.02)
DIAGNOSIS, 93000: NORMAL
DIFFERENTIAL METHOD BLD: ABNORMAL
EOSINOPHIL # BLD: 0.2 K/UL (ref 0–0.4)
EOSINOPHIL NFR BLD: 2 % (ref 0–7)
EPITH CASTS URNS QL MICRO: ABNORMAL /LPF
ERYTHROCYTE [DISTWIDTH] IN BLOOD BY AUTOMATED COUNT: 14.3 % (ref 11.5–14.5)
GLOBULIN SER CALC-MCNC: 5.1 G/DL (ref 2–4)
GLUCOSE SERPL-MCNC: 138 MG/DL (ref 65–100)
GLUCOSE UR STRIP.AUTO-MCNC: NEGATIVE MG/DL
HCG UR QL: NEGATIVE
HCT VFR BLD AUTO: 46.9 % (ref 35–47)
HGB BLD-MCNC: 15 G/DL (ref 11.5–16)
HGB UR QL STRIP: NEGATIVE
IMM GRANULOCYTES # BLD AUTO: 0 K/UL (ref 0–0.04)
IMM GRANULOCYTES NFR BLD AUTO: 0 % (ref 0–0.5)
KETONES UR QL STRIP.AUTO: ABNORMAL MG/DL
LEUKOCYTE ESTERASE UR QL STRIP.AUTO: NEGATIVE
LIPASE SERPL-CCNC: 85 U/L (ref 73–393)
LYMPHOCYTES # BLD: 1.2 K/UL (ref 0.8–3.5)
LYMPHOCYTES NFR BLD: 15 % (ref 12–49)
MCH RBC QN AUTO: 25.5 PG (ref 26–34)
MCHC RBC AUTO-ENTMCNC: 32 G/DL (ref 30–36.5)
MCV RBC AUTO: 79.8 FL (ref 80–99)
MONOCYTES # BLD: 0.4 K/UL (ref 0–1)
MONOCYTES NFR BLD: 5 % (ref 5–13)
MUCOUS THREADS URNS QL MICRO: ABNORMAL /LPF
NEUTS SEG # BLD: 6.1 K/UL (ref 1.8–8)
NEUTS SEG NFR BLD: 78 % (ref 32–75)
NITRITE UR QL STRIP.AUTO: NEGATIVE
NRBC # BLD: 0 K/UL (ref 0–0.01)
NRBC BLD-RTO: 0 PER 100 WBC
P-R INTERVAL, ECG05: 146 MS
PH UR STRIP: 5.5 [PH] (ref 5–8)
PLATELET # BLD AUTO: 458 K/UL (ref 150–400)
PMV BLD AUTO: 10.3 FL (ref 8.9–12.9)
POTASSIUM SERPL-SCNC: 4 MMOL/L (ref 3.5–5.1)
PROT SERPL-MCNC: 8.7 G/DL (ref 6.4–8.2)
PROT UR STRIP-MCNC: 30 MG/DL
Q-T INTERVAL, ECG07: 362 MS
QRS DURATION, ECG06: 78 MS
QTC CALCULATION (BEZET), ECG08: 409 MS
RBC # BLD AUTO: 5.88 M/UL (ref 3.8–5.2)
RBC #/AREA URNS HPF: ABNORMAL /HPF (ref 0–5)
SAMPLES BEING HELD,HOLD: NORMAL
SODIUM SERPL-SCNC: 138 MMOL/L (ref 136–145)
SP GR UR REFRACTOMETRY: >1.03
TROPONIN I SERPL-MCNC: <0.05 NG/ML
UR CULT HOLD, URHOLD: NORMAL
UROBILINOGEN UR QL STRIP.AUTO: 1 EU/DL (ref 0.2–1)
VENTRICULAR RATE, ECG03: 77 BPM
WBC # BLD AUTO: 8 K/UL (ref 3.6–11)
WBC URNS QL MICRO: ABNORMAL /HPF (ref 0–4)

## 2021-02-07 PROCEDURE — 84484 ASSAY OF TROPONIN QUANT: CPT

## 2021-02-07 PROCEDURE — 81001 URINALYSIS AUTO W/SCOPE: CPT

## 2021-02-07 PROCEDURE — 96375 TX/PRO/DX INJ NEW DRUG ADDON: CPT

## 2021-02-07 PROCEDURE — 74011250636 HC RX REV CODE- 250/636: Performed by: EMERGENCY MEDICINE

## 2021-02-07 PROCEDURE — 96374 THER/PROPH/DIAG INJ IV PUSH: CPT

## 2021-02-07 PROCEDURE — 99283 EMERGENCY DEPT VISIT LOW MDM: CPT

## 2021-02-07 PROCEDURE — 93005 ELECTROCARDIOGRAM TRACING: CPT

## 2021-02-07 PROCEDURE — 83690 ASSAY OF LIPASE: CPT

## 2021-02-07 PROCEDURE — 81025 URINE PREGNANCY TEST: CPT

## 2021-02-07 PROCEDURE — 85025 COMPLETE CBC W/AUTO DIFF WBC: CPT

## 2021-02-07 PROCEDURE — 80053 COMPREHEN METABOLIC PANEL: CPT

## 2021-02-07 PROCEDURE — 74176 CT ABD & PELVIS W/O CONTRAST: CPT

## 2021-02-07 RX ORDER — CEPHALEXIN 500 MG/1
500 CAPSULE ORAL 4 TIMES DAILY
Qty: 28 CAP | Refills: 0 | Status: SHIPPED | OUTPATIENT
Start: 2021-02-07 | End: 2021-02-14

## 2021-02-07 RX ORDER — KETOROLAC TROMETHAMINE 30 MG/ML
30 INJECTION, SOLUTION INTRAMUSCULAR; INTRAVENOUS ONCE
Status: COMPLETED | OUTPATIENT
Start: 2021-02-07 | End: 2021-02-07

## 2021-02-07 RX ORDER — ONDANSETRON 2 MG/ML
4 INJECTION INTRAMUSCULAR; INTRAVENOUS ONCE
Status: COMPLETED | OUTPATIENT
Start: 2021-02-07 | End: 2021-02-07

## 2021-02-07 RX ADMIN — ONDANSETRON 4 MG: 2 INJECTION INTRAMUSCULAR; INTRAVENOUS at 06:42

## 2021-02-07 RX ADMIN — KETOROLAC TROMETHAMINE 30 MG: 30 INJECTION, SOLUTION INTRAMUSCULAR at 06:42

## 2021-02-07 RX ADMIN — SODIUM CHLORIDE 1000 ML: 9 INJECTION, SOLUTION INTRAVENOUS at 06:40

## 2021-02-07 NOTE — ED TRIAGE NOTES
Triage: Pt arrives from home with CC of left sided flank pain that started yesterday that is now radiating into her chest. She denies SOB. Endorses some nausea.

## 2021-02-07 NOTE — ED PROVIDER NOTES
23-year-old female with past medical history significant for recent kidney stone diagnosed in October 2020 presents with complaints of left flank pain radiating to left chest and back. Patient reports it feels similar to her kidney stone pain in the past.  Associated with nausea. Denies cough, URI complaints, shortness of breath. Denies diaphoresis. Patient reports normal appetite. Denies fever, chills. Denies coronavirus exposure concern. Denies urinary complaints. Denies pregnancy. No relief with Toradol at home  Denies tobacco use, drug use, alcohol use  Primary CARE physician-Murtaza           Past Medical History:   Diagnosis Date    Acne     Anxiety     Depression        No past surgical history on file.       Family History:   Problem Relation Age of Onset    Asthma Mother     Anxiety Mother     Stroke Paternal Grandmother        Social History     Socioeconomic History    Marital status: UNKNOWN     Spouse name: Not on file    Number of children: Not on file    Years of education: Not on file    Highest education level: Not on file   Occupational History    Not on file   Social Needs    Financial resource strain: Not on file    Food insecurity     Worry: Not on file     Inability: Not on file   Fenergo needs     Medical: Not on file     Non-medical: Not on file   Tobacco Use    Smoking status: Never Smoker    Smokeless tobacco: Never Used   Substance and Sexual Activity    Alcohol use: Never     Frequency: Never    Drug use: Never    Sexual activity: Yes     Partners: Male     Birth control/protection: Pill   Lifestyle    Physical activity     Days per week: Not on file     Minutes per session: Not on file    Stress: Not on file   Relationships    Social connections     Talks on phone: Not on file     Gets together: Not on file     Attends Yazidism service: Not on file     Active member of club or organization: Not on file     Attends meetings of clubs or organizations: Not on file     Relationship status: Not on file    Intimate partner violence     Fear of current or ex partner: Not on file     Emotionally abused: Not on file     Physically abused: Not on file     Forced sexual activity: Not on file   Other Topics Concern    Not on file   Social History Narrative    ** Merged History Encounter **              ALLERGIES: Patient has no known allergies. Review of Systems   Constitutional: Negative for chills and fever. HENT: Negative for congestion, nosebleeds and rhinorrhea. Eyes: Negative for pain and redness. Respiratory: Negative for cough and shortness of breath. Cardiovascular: Positive for chest pain. Negative for palpitations. Gastrointestinal: Negative for abdominal pain, nausea and vomiting. Genitourinary: Positive for flank pain. Negative for dysuria, frequency, vaginal bleeding and vaginal pain. Musculoskeletal: Positive for back pain. Negative for myalgias. Skin: Negative for rash and wound. Neurological: Negative for seizures, syncope and weakness. Hematological: Does not bruise/bleed easily. Psychiatric/Behavioral: Negative for agitation, confusion, dysphoric mood and suicidal ideas. The patient is not nervous/anxious. Vitals:    02/07/21 0534   BP: (!) 187/114   Pulse: 89   Resp: 16   Temp: 97.2 °F (36.2 °C)   SpO2: 97%            Physical Exam  Vitals signs and nursing note reviewed. Constitutional:       Appearance: She is well-developed. HENT:      Head: Normocephalic and atraumatic. Eyes:      Pupils: Pupils are equal, round, and reactive to light. Neck:      Musculoskeletal: Normal range of motion and neck supple. Trachea: No tracheal deviation. Cardiovascular:      Rate and Rhythm: Normal rate and regular rhythm. Heart sounds: Normal heart sounds. Pulmonary:      Effort: Pulmonary effort is normal. No respiratory distress. Breath sounds: Normal breath sounds. No stridor.  No wheezing or rales. Chest:      Chest wall: No tenderness. Abdominal:      General: Bowel sounds are normal. There is no distension. Palpations: Abdomen is soft. Tenderness: There is no abdominal tenderness. There is no right CVA tenderness, left CVA tenderness or rebound. Musculoskeletal: Normal range of motion. General: No tenderness. Skin:     General: Skin is warm and dry. Coloration: Skin is not pale. Findings: No rash. Neurological:      Mental Status: She is alert and oriented to person, place, and time. Cranial Nerves: No cranial nerve deficit. MDM  Number of Diagnoses or Management Options  Diagnosis management comments: 61-year-old female with history of nephrolithiasis presents with complaints of left flank pain radiating to her chest and back similar to her kidney stone pain in the past.  Patient is well-appearing, obese, afebrile, nontoxic, hemodynamically stable, no respiratory distress, clear to auscultation bilaterally, normal urine oxygen saturation, abdomen soft/nontender/nondistended, no CVA tenderness to palpation. Plan-urine pregnancy test, urinalysis, CBC/CMP, pain control, ct abdomen pelvis. Amount and/or Complexity of Data Reviewed  Clinical lab tests: ordered and reviewed  Tests in the radiology section of CPT®: ordered and reviewed  Independent visualization of images, tracings, or specimens: yes    Patient Progress  Patient progress: improved         Procedures    ED EKG interpretation:  Rhythm: normal sinus rhythm; and regular . Rate (approx.): 77; Axis: normal; P wave: normal; QRS interval: normal ; ST/T wave: normal; Other findings: normal, no ischemia. This EKG was interpreted by Vitor Lewis MD,ED Provider. 7:24 AM  Pain resolved. Awaiting CT results. 7:33 AM  Change of shift. Care of patient signed over to [facility]. Bedside handoff complete. Awaiting CT a/p results. Mina Gaxiola

## 2021-02-07 NOTE — ED NOTES
7 AM  Change of shift. Care of patient taken over from Dr. Jose Daniel Fair; H&P reviewed, bedside handoff complete. Awaiting ct abd pel. CT of the abdomen pelvis shows no acute pathology. Patient complains of left flank pain. She is concerned that she may have a urinary tract infection. I reviewed the laboratory studies and urinalysis. UA shows significant bacteria but also has a significant amount of epithelial cells. I will prescribe the patient Keflex and advised her to begin tomorrow if she is continuing to have the flank pain. Also recommended that she follow-up with her primary care physician tomorrow. She was advised take Motrin and Tylenol for pain control.   Patient is comfortable and agreeable to plan of care and aware of return precautions    Early MD Patricia

## 2021-06-14 ENCOUNTER — OFFICE VISIT (OUTPATIENT)
Dept: FAMILY MEDICINE CLINIC | Age: 36
End: 2021-06-14
Payer: COMMERCIAL

## 2021-06-14 VITALS
TEMPERATURE: 98.6 F | WEIGHT: 293 LBS | OXYGEN SATURATION: 98 % | BODY MASS INDEX: 45.99 KG/M2 | HEART RATE: 94 BPM | DIASTOLIC BLOOD PRESSURE: 91 MMHG | RESPIRATION RATE: 16 BRPM | HEIGHT: 67 IN | SYSTOLIC BLOOD PRESSURE: 141 MMHG

## 2021-06-14 DIAGNOSIS — Z11.59 ENCOUNTER FOR HEPATITIS C SCREENING TEST FOR LOW RISK PATIENT: ICD-10-CM

## 2021-06-14 DIAGNOSIS — Z13.1 SCREENING FOR DIABETES MELLITUS: ICD-10-CM

## 2021-06-14 DIAGNOSIS — R03.0 ELEVATED BLOOD PRESSURE READING WITHOUT DIAGNOSIS OF HYPERTENSION: ICD-10-CM

## 2021-06-14 DIAGNOSIS — Z13.220 SCREENING, LIPID: ICD-10-CM

## 2021-06-14 DIAGNOSIS — Z00.00 ROUTINE GENERAL MEDICAL EXAMINATION AT A HEALTH CARE FACILITY: Primary | ICD-10-CM

## 2021-06-14 PROCEDURE — 99395 PREV VISIT EST AGE 18-39: CPT | Performed by: NURSE PRACTITIONER

## 2021-06-14 RX ORDER — PHENTERMINE HYDROCHLORIDE 37.5 MG/1
1 TABLET ORAL DAILY
COMMUNITY
Start: 2021-03-18

## 2021-06-14 NOTE — PROGRESS NOTES
Assessment/ Plan:   Full age appropriate History and Physical exam as well as health care maintenance  performed and discussed today. Risk factor modification discussed today includes safe sex practices, healthy diet and exercise, and seat belt use. Continue current medications. Diagnoses and all orders for this visit:    1. Routine general medical examination at a health care facility  -     CBC WITH AUTOMATED DIFF; Future  -     METABOLIC PANEL, COMPREHENSIVE; Future  -     TSH 3RD GENERATION; Future    2. Screening, lipid  -     LIPID PANEL; Future    3. Encounter for hepatitis C screening test for low risk patient  -     HEPATITIS C AB; Future    4. Screening for diabetes mellitus  -     HEMOGLOBIN A1C WITH EAG; Future    5. Elevated blood pressure reading without diagnosis of hypertension  She is working towards lifestyle modification which I have encouraged at this time. She has lost 30 pounds recently. Follow-up and Dispositions    · Return in about 6 months (around 12/14/2021) for Follow Up. Discussed expected course/resolution/complications of diagnosis in detail with patient.    Medication risks/benefits/costs/interactions/alternatives discussed with patient.    Pt was given after visit summary which includes diagnoses, current medications & vitals. Pt expressed understanding with the diagnosis and plan      HPI:   Joann March is a 29 y.o. female who presents for annual exam.    She is followed by ob/gyn. She is managed on Phentermine through their office. She has lost 30 pounds. She is up to date on pap smear. She had a recent history of kidney stones. She is fully COVID vacinnated. She is engaged to be . They would like to have children in the near future. Current Outpatient Medications   Medication Sig Dispense Refill    phentermine (ADIPEX-P) 37.5 mg tablet Take 1 Tablet by mouth daily.  multivitamin (ONE A DAY) tablet Take 1 Tab by mouth daily.  B.infantis-B.ani-B.long-B.bifi (PROBIOTIC 4X) 10-15 mg TbEC Take  by mouth.  mometasone (NASONEX) 50 mcg/actuation nasal spray 2 Sprays daily. No Known Allergies   Patient Active Problem List   Diagnosis Code    [facility] mor[facility] ([facility] E66.01     Past Medical History:   Diagnosis Date    Acne     Anxiety     Depression     Kidney stones       History reviewed. No pertinent surgical history. Patient's last menstrual period was 05/13/2021 (approximate). Family History   Problem Relation Age of Onset    Asthma Mother     Anxiety Mother     Stroke Paternal Grandmother       Social History     Socioeconomic History    Marital status: LIFE PARTNER     Spouse name: Not on file    Number of children: Not on file    Years of education: Not on file    Highest education level: Not on file   Occupational History    Not on file   Tobacco Use    Smoking status: Never Smoker    Smokeless tobacco: Never Used   Substance and Sexual Activity    Alcohol use: Never    Drug use: Never    Sexual activity: Yes     Partners: Male     Birth control/protection: Pill   Other Topics Concern    Not on file   Social History Narrative    ** Merged History Encounter **          Social Determinants of Health     Financial Resource Strain:     Difficulty of Paying Living Expenses:    Food Insecurity:     Worried About Running Out of Food in the Last Year:     Ran Out of Food in the Last Year:    Transportation Needs:     Lack of Transportation (Medical):      Lack of Transportation (Non-Medical):    Physical Activity:     Days of Exercise per Week:     Minutes of Exercise per Session:    Stress:     Feeling of Stress :    Social Connections:     Frequency of Communication with Friends and Family:     Frequency of Social Gatherings with Friends and Family:     Attends Confucianism Services:     Active Member of Clubs or Organizations:     Attends Club or Organization Meetings:     Marital Status: Intimate Partner Violence:     Fear of Current or Ex-Partner:     Emotionally Abused:     Physically Abused:     Sexually Abused:         ROS:     Review of Systems   Constitutional: Negative for fever, malaise/fatigue and weight loss. HENT: Negative for hearing loss. Eyes: Negative for blurred vision and pain. Respiratory: Negative for cough and shortness of breath. Cardiovascular: Negative for chest pain, palpitations and leg swelling. Gastrointestinal: Negative for abdominal pain, blood in stool, constipation, diarrhea and melena. Genitourinary: Negative for dysuria and hematuria. Musculoskeletal: Negative for joint pain. Skin: Negative for rash. Neurological: Negative for headaches. Psychiatric/Behavioral: Negative for depression. The patient is not nervous/anxious and does not have insomnia. Physical Exam:     Visit Vitals  BP (!) 141/91 (BP 1 Location: Left upper arm, BP Patient Position: Sitting)   Pulse 94   Temp 98.6 °F (37 °C) (Temporal)   Resp 16   Ht 5' 7\" (1.702 m)   Wt 329 lb 9.6 oz (149.5 kg)   LMP 05/13/2021 (Approximate)   SpO2 98%   BMI 51.62 kg/m²        Nursing Documentation and Vital Signs Reviewed. Physical Exam  Constitutional:       General: She is not in acute distress. Appearance: She is obese. HENT:      Right Ear: No drainage. No middle ear effusion. Tympanic membrane is not injected, erythematous or bulging. Left Ear: No drainage. No middle ear effusion. Tympanic membrane is not injected, erythematous or bulging. Eyes:      Pupils: Pupils are equal, round, and reactive to light. Neck:      Trachea: No tracheal deviation. Cardiovascular:      Pulses:           Dorsalis pedis pulses are 2+ on the right side and 2+ on the left side. Posterior tibial pulses are 2+ on the right side and 2+ on the left side. Heart sounds: S1 normal and S2 normal. No murmur heard. No friction rub. No gallop.     Pulmonary:      Breath sounds: Normal breath sounds. No wheezing. Abdominal:      General: Bowel sounds are normal. There is no distension. Palpations: Abdomen is soft. There is no mass. Tenderness: There is no abdominal tenderness. Lymphadenopathy:      Cervical: No cervical adenopathy. Skin:     General: Skin is warm and dry. Neurological:      Cranial Nerves: No cranial nerve deficit. Sensory: Sensation is intact. Motor: Motor function is intact.

## 2021-06-14 NOTE — PROGRESS NOTES
Chief Complaint   Patient presents with    Complete Physical     1. Have you been to the ER, urgent care clinic since your last visit? Hospitalized since your last visit? Yes When: October [facility] Kidney stones    2. Have you seen or consulted any other health care providers outside of the [address] [place] [address] since your last visit? Include any pap smears or colon screening.  Yes When: GYN Marylin WHITE

## 2021-06-15 LAB
ALBUMIN SERPL-MCNC: 4 G/DL (ref 3.8–4.8)
ALBUMIN/GLOB SERPL: 1.1 {RATIO} (ref 1.2–2.2)
ALP SERPL-CCNC: 133 IU/L (ref 48–121)
ALT SERPL-CCNC: 22 IU/L (ref 0–32)
AST SERPL-CCNC: 11 IU/L (ref 0–40)
BASOPHILS # BLD AUTO: 0 X10E3/UL (ref 0–0.2)
BASOPHILS NFR BLD AUTO: 1 %
BILIRUB SERPL-MCNC: 0.3 MG/DL (ref 0–1.2)
BUN SERPL-MCNC: 15 MG/DL (ref 6–20)
BUN/CREAT SERPL: 21 (ref 9–23)
CALCIUM SERPL-MCNC: 9.4 MG/DL (ref 8.7–10.2)
CHLORIDE SERPL-SCNC: 103 MMOL/L (ref 96–106)
CHOLEST SERPL-MCNC: 130 MG/DL (ref 100–199)
CO2 SERPL-SCNC: 20 MMOL/L (ref 20–29)
CREAT SERPL-MCNC: 0.7 MG/DL (ref 0.57–1)
EOSINOPHIL # BLD AUTO: 0.3 X10E3/UL (ref 0–0.4)
EOSINOPHIL NFR BLD AUTO: 3 %
ERYTHROCYTE [DISTWIDTH] IN BLOOD BY AUTOMATED COUNT: 14.8 % (ref 11.7–15.4)
EST. AVERAGE GLUCOSE BLD GHB EST-MCNC: 117 MG/DL
GLOBULIN SER CALC-MCNC: 3.6 G/DL (ref 1.5–4.5)
GLUCOSE SERPL-MCNC: 92 MG/DL (ref 65–99)
HBA1C MFR BLD: 5.7 % (ref 4.8–5.6)
HCT VFR BLD AUTO: 44.5 % (ref 34–46.6)
HCV AB S/CO SERPL IA: <0.1 S/CO RATIO (ref 0–0.9)
HDLC SERPL-MCNC: 45 MG/DL
HGB BLD-MCNC: 14.4 G/DL (ref 11.1–15.9)
IMM GRANULOCYTES # BLD AUTO: 0 X10E3/UL (ref 0–0.1)
IMM GRANULOCYTES NFR BLD AUTO: 0 %
IMP & REVIEW OF LAB RESULTS: NORMAL
LDLC SERPL CALC-MCNC: 72 MG/DL (ref 0–99)
LYMPHOCYTES # BLD AUTO: 1.6 X10E3/UL (ref 0.7–3.1)
LYMPHOCYTES NFR BLD AUTO: 21 %
MCH RBC QN AUTO: 25.4 PG (ref 26.6–33)
MCHC RBC AUTO-ENTMCNC: 32.4 G/DL (ref 31.5–35.7)
MCV RBC AUTO: 79 FL (ref 79–97)
MONOCYTES # BLD AUTO: 0.4 X10E3/UL (ref 0.1–0.9)
MONOCYTES NFR BLD AUTO: 6 %
NEUTROPHILS # BLD AUTO: 5.3 X10E3/UL (ref 1.4–7)
NEUTROPHILS NFR BLD AUTO: 69 %
PLATELET # BLD AUTO: 419 X10E3/UL (ref 150–450)
POTASSIUM SERPL-SCNC: 4.8 MMOL/L (ref 3.5–5.2)
PROT SERPL-MCNC: 7.6 G/DL (ref 6–8.5)
RBC # BLD AUTO: 5.66 X10E6/UL (ref 3.77–5.28)
SODIUM SERPL-SCNC: 140 MMOL/L (ref 134–144)
TRIGL SERPL-MCNC: 59 MG/DL (ref 0–149)
TSH SERPL DL<=0.005 MIU/L-ACNC: 3.47 UIU/ML (ref 0.45–4.5)
VLDLC SERPL CALC-MCNC: 13 MG/DL (ref 5–40)
WBC # BLD AUTO: 7.7 X10E3/UL (ref 3.4–10.8)